# Patient Record
Sex: FEMALE | Race: AMERICAN INDIAN OR ALASKA NATIVE
[De-identification: names, ages, dates, MRNs, and addresses within clinical notes are randomized per-mention and may not be internally consistent; named-entity substitution may affect disease eponyms.]

---
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
## 2023-10-18 ENCOUNTER — OFFICE VISIT (OUTPATIENT)
Dept: PRIMARY CARE CLINIC | Age: 23
End: 2023-10-18

## 2023-10-18 VITALS
WEIGHT: 99.6 LBS | TEMPERATURE: 98.2 F | DIASTOLIC BLOOD PRESSURE: 64 MMHG | BODY MASS INDEX: 19.56 KG/M2 | HEIGHT: 60 IN | OXYGEN SATURATION: 99 % | HEART RATE: 79 BPM | RESPIRATION RATE: 20 BRPM | SYSTOLIC BLOOD PRESSURE: 96 MMHG

## 2023-10-18 DIAGNOSIS — R35.0 URINARY FREQUENCY: Primary | ICD-10-CM

## 2023-10-18 LAB
BILIRUBIN, POC: NEGATIVE
BLOOD URINE, POC: NEGATIVE
CLARITY, POC: NORMAL
COLOR, POC: NORMAL
GLUCOSE URINE, POC: NEGATIVE
KETONES, POC: NORMAL
LEUKOCYTE EST, POC: NEGATIVE
NITRITE, POC: NEGATIVE
PH, POC: 7
PROTEIN, POC: NEGATIVE
SPECIFIC GRAVITY, POC: 1.02
UROBILINOGEN, POC: NORMAL

## 2023-10-18 PROCEDURE — 99203 OFFICE O/P NEW LOW 30 MIN: CPT | Performed by: FAMILY MEDICINE

## 2023-10-18 PROCEDURE — 81002 URINALYSIS NONAUTO W/O SCOPE: CPT | Performed by: FAMILY MEDICINE

## 2023-10-18 RX ORDER — NITROFURANTOIN 25; 75 MG/1; MG/1
100 CAPSULE ORAL 2 TIMES DAILY
Qty: 10 CAPSULE | Refills: 0 | Status: SHIPPED | OUTPATIENT
Start: 2023-10-18 | End: 2023-10-23

## 2023-10-19 NOTE — ULTRASOUND REPORT
ULTRASOUND OBSTETRIC 



Indication:

fetal well being, gestational hypertension



Findings:

There is a single intrauterine pregnancy. 



BPD = 8.7 cm = 36 weeks, 1 day(s).

Head circumference = 32.3 cm = 36 weeks, 4 day(s).

Abdominal circumference = 30.6 cm = 34 weeks, 4 day(s).

Femur length = 6.9 cm = 35 weeks, 4 day(s).



Overall estimated sonographic age = 36 weeks, 3 day(s).



Fetal heart rate is 134  beats per minute. 

Estimated fetal birth weight is 2593  grams



Fetal position is cephalic. 

Cervix appears closed. 

Fetal movement is present. 

Placenta is posterior and grade 1. 

Amniotic fluid volume appears normal. 

Maternal adnexa are not visualized.



Impression:

1. Single living intrauterine pregnancy with estimated sonographic age of 36 weeks, 3  day(s).

2. No sonographic abnormality identified.



Signer Name: Clarence Price MD 

Signed: 9/21/2019 6:11 AM

 Workstation Name: [id number] No (4) rarely moist

## 2023-10-25 ASSESSMENT — ENCOUNTER SYMPTOMS
VOMITING: 0
WHEEZING: 0
ABDOMINAL PAIN: 0
CONSTIPATION: 0
COUGH: 0
DIARRHEA: 0
NAUSEA: 0
SHORTNESS OF BREATH: 0